# Patient Record
Sex: FEMALE | Race: OTHER | HISPANIC OR LATINO | Employment: UNEMPLOYED | URBAN - METROPOLITAN AREA
[De-identification: names, ages, dates, MRNs, and addresses within clinical notes are randomized per-mention and may not be internally consistent; named-entity substitution may affect disease eponyms.]

---

## 2023-10-31 ENCOUNTER — OFFICE VISIT (OUTPATIENT)
Dept: PRIMARY CARE | Facility: CLINIC | Age: 48
End: 2023-10-31
Payer: COMMERCIAL

## 2023-10-31 VITALS
BODY MASS INDEX: 38.89 KG/M2 | OXYGEN SATURATION: 95 % | HEIGHT: 61 IN | RESPIRATION RATE: 12 BRPM | SYSTOLIC BLOOD PRESSURE: 123 MMHG | WEIGHT: 206 LBS | HEART RATE: 77 BPM | DIASTOLIC BLOOD PRESSURE: 83 MMHG

## 2023-10-31 DIAGNOSIS — E55.9 VITAMIN D DEFICIENCY: Primary | ICD-10-CM

## 2023-10-31 DIAGNOSIS — K21.9 GASTROESOPHAGEAL REFLUX DISEASE, UNSPECIFIED WHETHER ESOPHAGITIS PRESENT: ICD-10-CM

## 2023-10-31 DIAGNOSIS — G47.33 OBSTRUCTIVE SLEEP APNEA: ICD-10-CM

## 2023-10-31 DIAGNOSIS — I10 HYPERTENSION, UNSPECIFIED TYPE: ICD-10-CM

## 2023-10-31 DIAGNOSIS — C50.912 MALIGNANT NEOPLASM OF LEFT FEMALE BREAST, UNSPECIFIED ESTROGEN RECEPTOR STATUS, UNSPECIFIED SITE OF BREAST (MULTI): ICD-10-CM

## 2023-10-31 DIAGNOSIS — E53.8 FOLIC ACID DEFICIENCY: ICD-10-CM

## 2023-10-31 DIAGNOSIS — E66.01 MORBID OBESITY (MULTI): Chronic | ICD-10-CM

## 2023-10-31 PROBLEM — Z86.018 HISTORY OF UTERINE FIBROID: Status: ACTIVE | Noted: 2023-10-31

## 2023-10-31 PROBLEM — G47.00 INSOMNIA: Status: ACTIVE | Noted: 2018-10-30

## 2023-10-31 PROBLEM — Z85.3 HISTORY OF LEFT BREAST CANCER: Status: ACTIVE | Noted: 2020-11-06

## 2023-10-31 PROBLEM — G47.30 SLEEP APNEA SYNDROME: Status: ACTIVE | Noted: 2018-10-30

## 2023-10-31 PROBLEM — Z90.710 H/O ABDOMINAL HYSTERECTOMY: Status: ACTIVE | Noted: 2023-10-31

## 2023-10-31 PROBLEM — C50.919 BREAST CANCER (MULTI): Status: ACTIVE | Noted: 2020-07-01

## 2023-10-31 PROBLEM — G47.36 SLEEP-RELATED HYPOVENTILATION DUE TO MEDICAL CONDITION: Status: ACTIVE | Noted: 2018-10-30

## 2023-10-31 PROCEDURE — 3079F DIAST BP 80-89 MM HG: CPT | Performed by: INTERNAL MEDICINE

## 2023-10-31 PROCEDURE — 3074F SYST BP LT 130 MM HG: CPT | Performed by: INTERNAL MEDICINE

## 2023-10-31 PROCEDURE — 1036F TOBACCO NON-USER: CPT | Performed by: INTERNAL MEDICINE

## 2023-10-31 PROCEDURE — 99204 OFFICE O/P NEW MOD 45 MIN: CPT | Performed by: INTERNAL MEDICINE

## 2023-10-31 RX ORDER — AMLODIPINE BESYLATE 10 MG/1
10 TABLET ORAL DAILY
Qty: 90 TABLET | Refills: 3 | Status: SHIPPED | OUTPATIENT
Start: 2023-10-31

## 2023-10-31 RX ORDER — PANTOPRAZOLE SODIUM 40 MG/1
40 TABLET, DELAYED RELEASE ORAL
COMMUNITY
End: 2023-10-31 | Stop reason: SDUPTHER

## 2023-10-31 RX ORDER — ENALAPRIL MALEATE AND HYDROCHLOROTHIAZIDE 5; 12.5 MG/1; MG/1
1 TABLET ORAL DAILY
COMMUNITY
Start: 2023-06-10 | End: 2023-10-31 | Stop reason: SDUPTHER

## 2023-10-31 RX ORDER — PANTOPRAZOLE SODIUM 40 MG/1
40 TABLET, DELAYED RELEASE ORAL
Qty: 90 TABLET | Refills: 3 | Status: SHIPPED | OUTPATIENT
Start: 2023-10-31

## 2023-10-31 RX ORDER — TAMOXIFEN CITRATE 20 MG/1
20 TABLET ORAL DAILY
COMMUNITY
End: 2023-10-31 | Stop reason: SDUPTHER

## 2023-10-31 RX ORDER — FOLIC ACID 1 MG/1
1 TABLET ORAL DAILY
Qty: 90 TABLET | Refills: 3 | Status: SHIPPED | OUTPATIENT
Start: 2023-10-31

## 2023-10-31 RX ORDER — ENALAPRIL MALEATE AND HYDROCHLOROTHIAZIDE 5; 12.5 MG/1; MG/1
1 TABLET ORAL DAILY
Qty: 90 TABLET | Refills: 3 | Status: SHIPPED | OUTPATIENT
Start: 2023-10-31

## 2023-10-31 RX ORDER — ERGOCALCIFEROL 1.25 1/1
1 CAPSULE ORAL
Qty: 12 CAPSULE | Refills: 0 | Status: SHIPPED | OUTPATIENT
Start: 2023-10-31

## 2023-10-31 RX ORDER — ERGOCALCIFEROL 1.25 1/1
1 CAPSULE ORAL
COMMUNITY
Start: 2023-03-22 | End: 2023-10-31 | Stop reason: SDUPTHER

## 2023-10-31 RX ORDER — FOLIC ACID 1 MG/1
1 TABLET ORAL DAILY
COMMUNITY
End: 2023-10-31 | Stop reason: SDUPTHER

## 2023-10-31 RX ORDER — NAPROXEN 500 MG/1
500 TABLET ORAL 2 TIMES DAILY
COMMUNITY

## 2023-10-31 RX ORDER — TAMOXIFEN CITRATE 20 MG/1
20 TABLET ORAL DAILY
Qty: 90 TABLET | Refills: 3 | Status: SHIPPED | OUTPATIENT
Start: 2023-10-31

## 2023-10-31 RX ORDER — AMLODIPINE BESYLATE 10 MG/1
10 TABLET ORAL DAILY
COMMUNITY
End: 2023-10-31 | Stop reason: SDUPTHER

## 2023-10-31 NOTE — PROGRESS NOTES
"Subjective   Chief complaint: Be Graves is a 48 y.o. female who presents for New Patient Visit (NPV is here for medication refill ).    HPI:  48 year old female presents today to establish care. States she has a hx of hx of breast cancer and Htn. States she had reconstruction surgery post mastectomy and is unhappy with the way it turned out. Wants a referral to fix this surgery. Complains that tamoxifen has caused her to gain weight. Interested in weight loss medication. Currently without some of her medications, tamoxifen and amlodopine, due to not having a doctor here, needs refill. Complains of cough at night. Wakes up 2-3 times due to cough. States she does have ANDERS and has a CPAP but not here in [place] yet.        Objective   /83   Pulse 77   Resp 12   Ht 1.549 m (5' 1\")   Wt 93.4 kg (206 lb)   SpO2 95%   BMI 38.92 kg/m²   Physical Exam  Constitutional:       General: She is not in acute distress.     Appearance: Normal appearance. She is not ill-appearing or diaphoretic.   HENT:      Head: Normocephalic and atraumatic.   Cardiovascular:      Rate and Rhythm: Normal rate and regular rhythm.      Pulses: Normal pulses.      Heart sounds: Normal heart sounds.   Pulmonary:      Effort: Pulmonary effort is normal. No respiratory distress.      Breath sounds: Normal breath sounds.   Neurological:      General: No focal deficit present.      Mental Status: She is alert and oriented to person, place, and time.         I have reviewed and reconciled the medication list with the patient today.   Current Outpatient Medications:     amLODIPine (Norvasc) 10 mg tablet, Take 1 tablet (10 mg) by mouth once daily., Disp: , Rfl:     enalapriL-hydrochlorothiazide 5-12.5 mg tablet, Take 1 tablet by mouth once daily., Disp: , Rfl:     folic acid (Folvite) 1 mg tablet, Take 1 tablet (1 mg) by mouth once daily., Disp: , Rfl:     naproxen (Naprosyn) 500 mg tablet, Take 1 tablet (500 mg) by mouth 2 times a day., " "Disp: , Rfl:     pantoprazole (ProtoNix) 40 mg EC tablet, Take 1 tablet (40 mg) by mouth once daily in the morning. Take before meals., Disp: , Rfl:     tamoxifen (Nolvadex) 20 mg tablet, Take 1 tablet (20 mg total) by mouth once daily., Disp: , Rfl:     Vitamin D2 1,250 mcg (50,000 unit) capsule, Take 1 capsule (1,250 mcg) by mouth 1 (one) time per week., Disp: , Rfl:      Imaging:  No results found.     Labs reviewed:    No results found for: \"WBC\", \"HGB\", \"HCT\", \"PLT\", \"CHOL\", \"TRIG\", \"HDL\", \"LDLDIRECT\", \"ALT\", \"AST\", \"NA\", \"K\", \"CL\", \"CREATININE\", \"BUN\", \"CO2\", \"TSH\", \"PSA\", \"INR\", \"GLUF\", \"HGBA1C\", \"ALBUR\"    Assessment/Plan   Problem List Items Addressed This Visit       Breast cancer (CMS/HCC)     Continue medication         Gastroesophageal reflux disease     Continue medication         Hypertension     Continue medications         Morbid obesity (CMS/HCC) (Chronic)     Weight loss options discussed. Diet and exercise reinforced         Obstructive sleep apnea     Continue CPAP          Other Visit Diagnoses       Vitamin D deficiency    -  Primary    Folic acid deficiency                Continue current medications as listed  Follow up in 4 weeks       "